# Patient Record
Sex: MALE | Race: WHITE | NOT HISPANIC OR LATINO | Employment: OTHER | URBAN - METROPOLITAN AREA
[De-identification: names, ages, dates, MRNs, and addresses within clinical notes are randomized per-mention and may not be internally consistent; named-entity substitution may affect disease eponyms.]

---

## 2023-08-23 ENCOUNTER — HOSPITAL ENCOUNTER (OUTPATIENT)
Dept: RADIOLOGY | Facility: CLINIC | Age: 73
Discharge: HOME | End: 2023-08-23
Attending: STUDENT IN AN ORGANIZED HEALTH CARE EDUCATION/TRAINING PROGRAM
Payer: MEDICARE

## 2023-08-23 ENCOUNTER — OFFICE VISIT (OUTPATIENT)
Dept: ORTHOPEDICS | Facility: CLINIC | Age: 73
End: 2023-08-23
Payer: MEDICARE

## 2023-08-23 VITALS — HEIGHT: 73 IN | WEIGHT: 234 LBS | BODY MASS INDEX: 31.01 KG/M2

## 2023-08-23 DIAGNOSIS — M79.641 RIGHT HAND PAIN: ICD-10-CM

## 2023-08-23 DIAGNOSIS — S68.110A AMPUTATION OF RIGHT INDEX FINGER: Primary | ICD-10-CM

## 2023-08-23 PROCEDURE — 99204 PR OFFICE/OUTPT VISIT, NEW, LEVL IV, 45-59 MIN: ICD-10-PCS | Mod: ,,, | Performed by: STUDENT IN AN ORGANIZED HEALTH CARE EDUCATION/TRAINING PROGRAM

## 2023-08-23 PROCEDURE — 99204 OFFICE O/P NEW MOD 45 MIN: CPT | Mod: ,,, | Performed by: STUDENT IN AN ORGANIZED HEALTH CARE EDUCATION/TRAINING PROGRAM

## 2023-08-23 PROCEDURE — 73130 XR HAND COMPLETE 3 VIEW RIGHT: ICD-10-PCS | Mod: RT,,, | Performed by: STUDENT IN AN ORGANIZED HEALTH CARE EDUCATION/TRAINING PROGRAM

## 2023-08-23 PROCEDURE — 73130 X-RAY EXAM OF HAND: CPT | Mod: RT,,, | Performed by: STUDENT IN AN ORGANIZED HEALTH CARE EDUCATION/TRAINING PROGRAM

## 2023-08-23 RX ORDER — FAMOTIDINE 40 MG/1
40 TABLET, FILM COATED ORAL NIGHTLY
COMMUNITY
Start: 2023-07-21

## 2023-08-23 RX ORDER — HYDROCODONE BITARTRATE AND ACETAMINOPHEN 5; 325 MG/1; MG/1
1 TABLET ORAL EVERY 6 HOURS PRN
Status: ON HOLD | COMMUNITY
Start: 2023-08-19 | End: 2023-08-25 | Stop reason: HOSPADM

## 2023-08-23 RX ORDER — IBUPROFEN 600 MG/1
600 TABLET ORAL EVERY 6 HOURS PRN
COMMUNITY
Start: 2023-08-19 | End: 2023-08-26

## 2023-08-23 RX ORDER — ALPRAZOLAM 0.25 MG/1
0.25 TABLET ORAL 2 TIMES DAILY PRN
COMMUNITY
Start: 2023-06-27

## 2023-08-23 RX ORDER — SODIUM CHLORIDE 9 MG/ML
INJECTION, SOLUTION INTRAVENOUS CONTINUOUS
Status: CANCELLED | OUTPATIENT
Start: 2023-08-23

## 2023-08-23 RX ORDER — CEPHALEXIN 500 MG/1
500 CAPSULE ORAL EVERY 12 HOURS
COMMUNITY
Start: 2023-08-19

## 2023-08-23 RX ORDER — PRAMIPEXOLE DIHYDROCHLORIDE 0.25 MG/1
0.5 TABLET ORAL NIGHTLY
COMMUNITY
Start: 2023-07-18

## 2023-08-23 RX ORDER — SERTRALINE HYDROCHLORIDE 100 MG/1
100 TABLET, FILM COATED ORAL NIGHTLY
COMMUNITY
Start: 2023-06-26

## 2023-08-23 RX ORDER — METFORMIN HYDROCHLORIDE 500 MG/1
500 TABLET ORAL 2 TIMES DAILY
COMMUNITY
Start: 2023-06-26

## 2023-08-23 RX ORDER — CARVEDILOL 25 MG/1
25 TABLET ORAL 2 TIMES DAILY
COMMUNITY
Start: 2023-07-07

## 2023-08-23 RX ORDER — GEMFIBROZIL 600 MG/1
600 TABLET, FILM COATED ORAL
COMMUNITY
Start: 2023-08-13

## 2023-08-23 RX ORDER — ATORVASTATIN CALCIUM 80 MG/1
80 TABLET, FILM COATED ORAL DAILY
COMMUNITY
Start: 2023-07-07

## 2023-08-23 RX ORDER — NAPROXEN SODIUM 220 MG/1
81 TABLET, FILM COATED ORAL DAILY
COMMUNITY

## 2023-08-23 RX ORDER — AMOXICILLIN 500 MG
CAPSULE ORAL DAILY
COMMUNITY

## 2023-08-23 RX ORDER — AMLODIPINE BESYLATE 2.5 MG/1
2.5 TABLET ORAL DAILY
COMMUNITY
Start: 2023-06-26

## 2023-08-23 NOTE — H&P (VIEW-ONLY)
Chief Complaint:  Right index finger table saw injury    Consulting Physician: No ref. provider found    History of present illness:    Patient is a 73-year-old right-hand dominant retired male who was cutting wood with a table saw and sustained an injury to his right hand on 08/18/2023.  He injured his index and middle finger.  The index finger was severely injured in the middle finger sustained a soft tissue laceration.  He was seen at an outside hospital and then was transferred to [facility] in [place].  He was evaluated by a PGY2 resident in the emergency department where a Plan was to irrigate the wound and close the skin.  Returned back home to this region and then scheduled an appointment with me.    He has pain in the index finger.  He is able to feel at the tip of the index finger.  The finger is held in a flexed position at the PIP joint.  He has significant pain with motion of this finger.  Makes it very clear during this interview that he is not interested in multiple surgeries on this finger.  He would like an amputation of this finger so that he can return to activity as soon as possible.    Past Medical History:   Diagnosis Date    Diabetes mellitus, type 2     High cholesterol     Hypertension     Spinal stenosis        Past Surgical History:   Procedure Laterality Date    BACK SURGERY      INJECTION OF NERVE      LAMINECTOMY      NECK SURGERY         Current Outpatient Medications   Medication Sig    ALPRAZolam (XANAX) 0.25 MG tablet Take 0.25 mg by mouth 2 (two) times daily as needed.    amLODIPine (NORVASC) 2.5 MG tablet Take 2.5 mg by mouth.    aspirin 81 MG Chew Take 81 mg by mouth once daily.    atorvastatin (LIPITOR) 80 MG tablet Take 80 mg by mouth.    carvediloL (COREG) 25 MG tablet Take 25 mg by mouth 2 (two) times daily.    cephALEXin (KEFLEX) 500 MG capsule Take 500 mg by mouth every 12 (twelve) hours.    famotidine (PEPCID) 40 MG tablet Take 40 mg by mouth every evening.     "gemfibroziL (LOPID) 600 MG tablet SMARTSI Tablet(s) By Mouth Morning-Evening    HYDROcodone-acetaminophen (NORCO) 5-325 mg per tablet Take 1 tablet by mouth every 6 (six) hours as needed.    ibuprofen (ADVIL,MOTRIN) 600 MG tablet Take 600 mg by mouth every 6 (six) hours as needed.    metFORMIN (GLUCOPHAGE) 500 MG tablet Take 500 mg by mouth 2 (two) times daily.    omega-3 fatty acids/fish oil (FISH OIL-OMEGA-3 FATTY ACIDS) 300-1,000 mg capsule Take by mouth once daily.    pramipexole (MIRAPEX) 0.25 MG tablet Take 0.25 mg by mouth.    sertraline (ZOLOFT) 100 MG tablet Take 100 mg by mouth.     No current facility-administered medications for this visit.       Review of patient's allergies indicates:  No Known Allergies    History reviewed. No pertinent family history.    Social History     Socioeconomic History    Marital status:    Tobacco Use    Smoking status: Never    Smokeless tobacco: Never   Substance and Sexual Activity    Alcohol use: Yes     Comment: social       Review of Systems:    Constitution:   Denies chills, fever, and sweats.  HENT:   Denies headaches or blurry vision.  Cardiovascular:  Denies chest pain or irregular heart beat.  Respiratory:   Denies cough or shortness of breath.  Gastrointestinal:  Denies abdominal pain, nausea, or vomiting.  Musculoskeletal:   Denies muscle cramps.  Neurological:   Denies dizziness or focal weakness.  Psychiatric/Behavior: Normal mental status.  Hematology/Lymph:  Denies bleeding problem or easy bruising/bleeding.  Skin:    Denies rash or suspicious lesions.    Examination:    Vital Signs:    Vitals:    23 1546   Weight: 106.1 kg (234 lb)   Height: 6' 1" (1.854 m)   PainSc:   6       Body mass index is 30.87 kg/m².    Constitution:   Well-developed, well nourished patient in no acute distress.  Neurological:   Alert and oriented x 3 and cooperative to examination.     Psychiatric/Behavior: Normal mental status.  Respiratory:   No shortness of " breath.  Eyes:    Extraoccular muscles intact  Skin:    No scars, rash or suspicious lesions.    MSK:   Right hand:  Traumatic laceration in the sagittal plane along the index finger on the dorsal side.  Tenderness to palpation over the PIP joint.  FDS and FDP tendons are intact to the index finger.  Sensation to light touch is intact to the radial and ulnar aspect on the volar side of the index finger.  He is unable to extend the index finger at the PIP joint.  There was also a traumatic laceration of the middle finger over the radial side extending into the nail which has been sutured.  Sutures are in place and there is no current evidence of infection.  Radial pulses 2+.  All fingers are warm and well perfused with brisk capillary refill    Imaging:   X-ray of the right hand shows severely comminuted proximal phalanx fracture of the index finger with a condylar split.  There appears to be some bone loss.  There is also a fracture involving the base of the middle phalanx with soft tissue disruption     Assessment:  Right index finger table saw injury    Plan:  I had a lengthy conversation with him about the nature of his injury.  I explained that if he wanted to salvage this finger, I would K-wire the bony fragments in an acceptable position and this would likely be stage I for a possible fusion in the future.  He also has a likely extensor disruption and I will need to repair that as well.  I explained that after all of this I do not ever think that this will be a normal finger and the best case scenario would be that he would have a PIP joint fusion.  I made it very clear to him that I am happy to reconstruct this finger for him however he would like an amputation.  He was worried that this may become a stiff and painful finger.  He also does not want to have multiple operations on his finger.  He would like 1 operation that would be definitive and he requests that it be an amputation.     Given that this is an  index finger and the amount of trauma that has sustained especially to the PIP joint, I think an amputation is a reasonable option.  I would amputated alf at the proximal phalanx to give him a small stub to pinch against while removing the distal aspect of the finger so that it does not get in his way when he was trying to use his hand.    Do not think the middle finger needs any additional treatment at this time.    I explained that surgery and the nature of their condition are not without risks. These include, but are not limited to, bleeding, infection, neurovascular compromise, neuroma formation, CRPS, phantom pain, wound complications, scarring, cosmetic defects, need for later and/or repeated surgeries, pain, loss of ROM, loss of function, deformity, functional abnormalities, stiffness, thromboembolic complications, compartment syndrome, loss of limb, loss of life, anesthetic complications, and other imponderables. I explained that these can occur despite the adequacy of treatments rendered, and that their risks are heightened given the nature of their condition. They verbalized understanding. They would like to continue with surgery at this time. If appropriate family was involved with surgical discussion.      Follow Up:  After surgery  Xray at next visit:  None

## 2023-08-23 NOTE — PROGRESS NOTES
Chief Complaint:  Right index finger table saw injury    Consulting Physician: No ref. provider found    History of present illness:    Patient is a 73-year-old right-hand dominant retired male who was cutting wood with a table saw and sustained an injury to his right hand on 08/18/2023.  He injured his index and middle finger.  The index finger was severely injured in the middle finger sustained a soft tissue laceration.  He was seen at an outside hospital and then was transferred to [facility] in [place].  He was evaluated by a PGY2 resident in the emergency department where a Plan was to irrigate the wound and close the skin.  Returned back home to this region and then scheduled an appointment with me.    He has pain in the index finger.  He is able to feel at the tip of the index finger.  The finger is held in a flexed position at the PIP joint.  He has significant pain with motion of this finger.  Makes it very clear during this interview that he is not interested in multiple surgeries on this finger.  He would like an amputation of this finger so that he can return to activity as soon as possible.    Past Medical History:   Diagnosis Date    Diabetes mellitus, type 2     High cholesterol     Hypertension     Spinal stenosis        Past Surgical History:   Procedure Laterality Date    BACK SURGERY      INJECTION OF NERVE      LAMINECTOMY      NECK SURGERY         Current Outpatient Medications   Medication Sig    ALPRAZolam (XANAX) 0.25 MG tablet Take 0.25 mg by mouth 2 (two) times daily as needed.    amLODIPine (NORVASC) 2.5 MG tablet Take 2.5 mg by mouth.    aspirin 81 MG Chew Take 81 mg by mouth once daily.    atorvastatin (LIPITOR) 80 MG tablet Take 80 mg by mouth.    carvediloL (COREG) 25 MG tablet Take 25 mg by mouth 2 (two) times daily.    cephALEXin (KEFLEX) 500 MG capsule Take 500 mg by mouth every 12 (twelve) hours.    famotidine (PEPCID) 40 MG tablet Take 40 mg by mouth every evening.     "gemfibroziL (LOPID) 600 MG tablet SMARTSI Tablet(s) By Mouth Morning-Evening    HYDROcodone-acetaminophen (NORCO) 5-325 mg per tablet Take 1 tablet by mouth every 6 (six) hours as needed.    ibuprofen (ADVIL,MOTRIN) 600 MG tablet Take 600 mg by mouth every 6 (six) hours as needed.    metFORMIN (GLUCOPHAGE) 500 MG tablet Take 500 mg by mouth 2 (two) times daily.    omega-3 fatty acids/fish oil (FISH OIL-OMEGA-3 FATTY ACIDS) 300-1,000 mg capsule Take by mouth once daily.    pramipexole (MIRAPEX) 0.25 MG tablet Take 0.25 mg by mouth.    sertraline (ZOLOFT) 100 MG tablet Take 100 mg by mouth.     No current facility-administered medications for this visit.       Review of patient's allergies indicates:  No Known Allergies    History reviewed. No pertinent family history.    Social History     Socioeconomic History    Marital status:    Tobacco Use    Smoking status: Never    Smokeless tobacco: Never   Substance and Sexual Activity    Alcohol use: Yes     Comment: social       Review of Systems:    Constitution:   Denies chills, fever, and sweats.  HENT:   Denies headaches or blurry vision.  Cardiovascular:  Denies chest pain or irregular heart beat.  Respiratory:   Denies cough or shortness of breath.  Gastrointestinal:  Denies abdominal pain, nausea, or vomiting.  Musculoskeletal:   Denies muscle cramps.  Neurological:   Denies dizziness or focal weakness.  Psychiatric/Behavior: Normal mental status.  Hematology/Lymph:  Denies bleeding problem or easy bruising/bleeding.  Skin:    Denies rash or suspicious lesions.    Examination:    Vital Signs:    Vitals:    23 1546   Weight: 106.1 kg (234 lb)   Height: 6' 1" (1.854 m)   PainSc:   6       Body mass index is 30.87 kg/m².    Constitution:   Well-developed, well nourished patient in no acute distress.  Neurological:   Alert and oriented x 3 and cooperative to examination.     Psychiatric/Behavior: Normal mental status.  Respiratory:   No shortness of " breath.  Eyes:    Extraoccular muscles intact  Skin:    No scars, rash or suspicious lesions.    MSK:   Right hand:  Traumatic laceration in the sagittal plane along the index finger on the dorsal side.  Tenderness to palpation over the PIP joint.  FDS and FDP tendons are intact to the index finger.  Sensation to light touch is intact to the radial and ulnar aspect on the volar side of the index finger.  He is unable to extend the index finger at the PIP joint.  There was also a traumatic laceration of the middle finger over the radial side extending into the nail which has been sutured.  Sutures are in place and there is no current evidence of infection.  Radial pulses 2+.  All fingers are warm and well perfused with brisk capillary refill    Imaging:   X-ray of the right hand shows severely comminuted proximal phalanx fracture of the index finger with a condylar split.  There appears to be some bone loss.  There is also a fracture involving the base of the middle phalanx with soft tissue disruption     Assessment:  Right index finger table saw injury    Plan:  I had a lengthy conversation with him about the nature of his injury.  I explained that if he wanted to salvage this finger, I would K-wire the bony fragments in an acceptable position and this would likely be stage I for a possible fusion in the future.  He also has a likely extensor disruption and I will need to repair that as well.  I explained that after all of this I do not ever think that this will be a normal finger and the best case scenario would be that he would have a PIP joint fusion.  I made it very clear to him that I am happy to reconstruct this finger for him however he would like an amputation.  He was worried that this may become a stiff and painful finger.  He also does not want to have multiple operations on his finger.  He would like 1 operation that would be definitive and he requests that it be an amputation.     Given that this is an  index finger and the amount of trauma that has sustained especially to the PIP joint, I think an amputation is a reasonable option.  I would amputated group home at the proximal phalanx to give him a small stub to pinch against while removing the distal aspect of the finger so that it does not get in his way when he was trying to use his hand.    Do not think the middle finger needs any additional treatment at this time.    I explained that surgery and the nature of their condition are not without risks. These include, but are not limited to, bleeding, infection, neurovascular compromise, neuroma formation, CRPS, phantom pain, wound complications, scarring, cosmetic defects, need for later and/or repeated surgeries, pain, loss of ROM, loss of function, deformity, functional abnormalities, stiffness, thromboembolic complications, compartment syndrome, loss of limb, loss of life, anesthetic complications, and other imponderables. I explained that these can occur despite the adequacy of treatments rendered, and that their risks are heightened given the nature of their condition. They verbalized understanding. They would like to continue with surgery at this time. If appropriate family was involved with surgical discussion.      Follow Up:  After surgery  Xray at next visit:  None

## 2023-08-24 ENCOUNTER — HOSPITAL ENCOUNTER (OUTPATIENT)
Dept: RADIOLOGY | Facility: HOSPITAL | Age: 73
Discharge: HOME | End: 2023-08-24
Attending: STUDENT IN AN ORGANIZED HEALTH CARE EDUCATION/TRAINING PROGRAM
Payer: MEDICARE

## 2023-08-24 ENCOUNTER — ANESTHESIA EVENT (OUTPATIENT)
Dept: SURGERY | Facility: HOSPITAL | Age: 73
End: 2023-08-24
Payer: MEDICARE

## 2023-08-24 DIAGNOSIS — S68.110A AMPUTATION OF RIGHT INDEX FINGER: ICD-10-CM

## 2023-08-24 PROCEDURE — 71046 X-RAY EXAM CHEST 2 VIEWS: CPT | Mod: TC

## 2023-08-24 RX ORDER — ACETAMINOPHEN, DIPHENHYDRAMINE HCL, PHENYLEPHRINE HCL 325; 25; 5 MG/1; MG/1; MG/1
1 TABLET ORAL NIGHTLY
COMMUNITY

## 2023-08-25 ENCOUNTER — HOSPITAL ENCOUNTER (OUTPATIENT)
Facility: HOSPITAL | Age: 73
Discharge: HOME | End: 2023-08-25
Attending: STUDENT IN AN ORGANIZED HEALTH CARE EDUCATION/TRAINING PROGRAM | Admitting: STUDENT IN AN ORGANIZED HEALTH CARE EDUCATION/TRAINING PROGRAM
Payer: MEDICARE

## 2023-08-25 ENCOUNTER — ANESTHESIA (OUTPATIENT)
Dept: SURGERY | Facility: HOSPITAL | Age: 73
End: 2023-08-25
Payer: MEDICARE

## 2023-08-25 DIAGNOSIS — S68.110A AMPUTATION OF RIGHT INDEX FINGER: ICD-10-CM

## 2023-08-25 DIAGNOSIS — M79.641 RIGHT HAND PAIN: ICD-10-CM

## 2023-08-25 LAB — POCT GLUCOSE: 107 MG/DL (ref 70–110)

## 2023-08-25 PROCEDURE — 63600175 PHARM REV CODE 636 W HCPCS: Performed by: STUDENT IN AN ORGANIZED HEALTH CARE EDUCATION/TRAINING PROGRAM

## 2023-08-25 PROCEDURE — D9220A PRA ANESTHESIA: Mod: ANES,,, | Performed by: ANESTHESIOLOGY

## 2023-08-25 PROCEDURE — 71000016 HC POSTOP RECOV ADDL HR: Performed by: STUDENT IN AN ORGANIZED HEALTH CARE EDUCATION/TRAINING PROGRAM

## 2023-08-25 PROCEDURE — 63600175 PHARM REV CODE 636 W HCPCS: Performed by: NURSE ANESTHETIST, CERTIFIED REGISTERED

## 2023-08-25 PROCEDURE — 36000707: Performed by: STUDENT IN AN ORGANIZED HEALTH CARE EDUCATION/TRAINING PROGRAM

## 2023-08-25 PROCEDURE — 71000015 HC POSTOP RECOV 1ST HR: Performed by: STUDENT IN AN ORGANIZED HEALTH CARE EDUCATION/TRAINING PROGRAM

## 2023-08-25 PROCEDURE — 37000008 HC ANESTHESIA 1ST 15 MINUTES: Performed by: STUDENT IN AN ORGANIZED HEALTH CARE EDUCATION/TRAINING PROGRAM

## 2023-08-25 PROCEDURE — D9220A PRA ANESTHESIA: Mod: CRNA,,, | Performed by: NURSE ANESTHETIST, CERTIFIED REGISTERED

## 2023-08-25 PROCEDURE — 36000706: Performed by: STUDENT IN AN ORGANIZED HEALTH CARE EDUCATION/TRAINING PROGRAM

## 2023-08-25 PROCEDURE — 25000003 PHARM REV CODE 250: Performed by: NURSE ANESTHETIST, CERTIFIED REGISTERED

## 2023-08-25 PROCEDURE — 88311 DECALCIFY TISSUE: CPT

## 2023-08-25 PROCEDURE — D9220A PRA ANESTHESIA: ICD-10-PCS | Mod: CRNA,,, | Performed by: NURSE ANESTHETIST, CERTIFIED REGISTERED

## 2023-08-25 PROCEDURE — D9220A PRA ANESTHESIA: ICD-10-PCS | Mod: ANES,,, | Performed by: ANESTHESIOLOGY

## 2023-08-25 PROCEDURE — 26951 PR AMPUTATION FINGER/THUMB: ICD-10-PCS | Mod: F6,,, | Performed by: STUDENT IN AN ORGANIZED HEALTH CARE EDUCATION/TRAINING PROGRAM

## 2023-08-25 PROCEDURE — 88302 TISSUE EXAM BY PATHOLOGIST: CPT | Performed by: STUDENT IN AN ORGANIZED HEALTH CARE EDUCATION/TRAINING PROGRAM

## 2023-08-25 PROCEDURE — 25000003 PHARM REV CODE 250: Performed by: STUDENT IN AN ORGANIZED HEALTH CARE EDUCATION/TRAINING PROGRAM

## 2023-08-25 PROCEDURE — 26951 AMPUTATION OF FINGER/THUMB: CPT | Mod: F6,,, | Performed by: STUDENT IN AN ORGANIZED HEALTH CARE EDUCATION/TRAINING PROGRAM

## 2023-08-25 PROCEDURE — 37000009 HC ANESTHESIA EA ADD 15 MINS: Performed by: STUDENT IN AN ORGANIZED HEALTH CARE EDUCATION/TRAINING PROGRAM

## 2023-08-25 RX ORDER — MORPHINE SULFATE 4 MG/ML
4 INJECTION, SOLUTION INTRAMUSCULAR; INTRAVENOUS
Status: CANCELLED | OUTPATIENT
Start: 2023-08-25

## 2023-08-25 RX ORDER — SODIUM CHLORIDE 9 MG/ML
INJECTION, SOLUTION INTRAVENOUS CONTINUOUS
Status: DISCONTINUED | OUTPATIENT
Start: 2023-08-25 | End: 2023-08-25 | Stop reason: HOSPADM

## 2023-08-25 RX ORDER — KETOROLAC TROMETHAMINE 30 MG/ML
INJECTION, SOLUTION INTRAMUSCULAR; INTRAVENOUS
Status: DISCONTINUED | OUTPATIENT
Start: 2023-08-25 | End: 2023-08-25

## 2023-08-25 RX ORDER — FENTANYL CITRATE 50 UG/ML
INJECTION, SOLUTION INTRAMUSCULAR; INTRAVENOUS
Status: DISCONTINUED | OUTPATIENT
Start: 2023-08-25 | End: 2023-08-25

## 2023-08-25 RX ORDER — LIDOCAINE HYDROCHLORIDE 10 MG/ML
1 INJECTION, SOLUTION EPIDURAL; INFILTRATION; INTRACAUDAL; PERINEURAL ONCE
Status: CANCELLED | OUTPATIENT
Start: 2023-08-25 | End: 2023-08-25

## 2023-08-25 RX ORDER — HYDROCODONE BITARTRATE AND ACETAMINOPHEN 5; 325 MG/1; MG/1
1 TABLET ORAL EVERY 6 HOURS PRN
Qty: 28 TABLET | Refills: 0 | Status: ON HOLD | OUTPATIENT
Start: 2023-08-25 | End: 2023-11-21 | Stop reason: HOSPADM

## 2023-08-25 RX ORDER — BUPIVACAINE HYDROCHLORIDE 2.5 MG/ML
INJECTION, SOLUTION EPIDURAL; INFILTRATION; INTRACAUDAL
Status: DISCONTINUED | OUTPATIENT
Start: 2023-08-25 | End: 2023-08-25 | Stop reason: HOSPADM

## 2023-08-25 RX ORDER — LIDOCAINE HYDROCHLORIDE 10 MG/ML
INJECTION INFILTRATION; PERINEURAL
Status: DISCONTINUED | OUTPATIENT
Start: 2023-08-25 | End: 2023-08-25 | Stop reason: HOSPADM

## 2023-08-25 RX ORDER — PROPOFOL 10 MG/ML
VIAL (ML) INTRAVENOUS CONTINUOUS PRN
Status: DISCONTINUED | OUTPATIENT
Start: 2023-08-25 | End: 2023-08-25

## 2023-08-25 RX ORDER — SODIUM CHLORIDE 9 MG/ML
INJECTION, SOLUTION INTRAVENOUS CONTINUOUS
Status: CANCELLED | OUTPATIENT
Start: 2023-08-25

## 2023-08-25 RX ORDER — INDOMETHACIN 25 MG/1
CAPSULE ORAL
Status: DISCONTINUED | OUTPATIENT
Start: 2023-08-25 | End: 2023-08-25 | Stop reason: HOSPADM

## 2023-08-25 RX ORDER — ACETAMINOPHEN 10 MG/ML
INJECTION, SOLUTION INTRAVENOUS
Status: DISCONTINUED | OUTPATIENT
Start: 2023-08-25 | End: 2023-08-25

## 2023-08-25 RX ORDER — LIDOCAINE HYDROCHLORIDE 20 MG/ML
INJECTION, SOLUTION EPIDURAL; INFILTRATION; INTRACAUDAL; PERINEURAL
Status: DISCONTINUED | OUTPATIENT
Start: 2023-08-25 | End: 2023-08-25

## 2023-08-25 RX ORDER — BUPIVACAINE HYDROCHLORIDE 2.5 MG/ML
INJECTION, SOLUTION EPIDURAL; INFILTRATION; INTRACAUDAL
Status: DISCONTINUED
Start: 2023-08-25 | End: 2023-08-25 | Stop reason: HOSPADM

## 2023-08-25 RX ORDER — HYDROCODONE BITARTRATE AND ACETAMINOPHEN 5; 325 MG/1; MG/1
1 TABLET ORAL EVERY 6 HOURS PRN
Qty: 28 TABLET | Refills: 0 | Status: SHIPPED | OUTPATIENT
Start: 2023-08-25 | End: 2023-08-25 | Stop reason: SDUPTHER

## 2023-08-25 RX ORDER — MORPHINE SULFATE 10 MG/ML
INJECTION INTRAMUSCULAR; INTRAVENOUS; SUBCUTANEOUS
Status: DISCONTINUED | OUTPATIENT
Start: 2023-08-25 | End: 2023-08-25

## 2023-08-25 RX ORDER — MIDAZOLAM HYDROCHLORIDE 1 MG/ML
INJECTION INTRAMUSCULAR; INTRAVENOUS
Status: DISCONTINUED | OUTPATIENT
Start: 2023-08-25 | End: 2023-08-25

## 2023-08-25 RX ORDER — LIDOCAINE HYDROCHLORIDE 10 MG/ML
INJECTION INFILTRATION; PERINEURAL
Status: DISCONTINUED
Start: 2023-08-25 | End: 2023-08-25 | Stop reason: HOSPADM

## 2023-08-25 RX ORDER — ACETAMINOPHEN 10 MG/ML
1000 INJECTION, SOLUTION INTRAVENOUS ONCE
Status: CANCELLED | OUTPATIENT
Start: 2023-08-25 | End: 2023-08-25

## 2023-08-25 RX ORDER — ONDANSETRON 2 MG/ML
4 INJECTION INTRAMUSCULAR; INTRAVENOUS DAILY PRN
Status: CANCELLED | OUTPATIENT
Start: 2023-08-25

## 2023-08-25 RX ORDER — SODIUM CHLORIDE, SODIUM GLUCONATE, SODIUM ACETATE, POTASSIUM CHLORIDE AND MAGNESIUM CHLORIDE 30; 37; 368; 526; 502 MG/100ML; MG/100ML; MG/100ML; MG/100ML; MG/100ML
INJECTION, SOLUTION INTRAVENOUS CONTINUOUS
Status: CANCELLED | OUTPATIENT
Start: 2023-08-25 | End: 2023-09-24

## 2023-08-25 RX ORDER — ACETAMINOPHEN 10 MG/ML
INJECTION, SOLUTION INTRAVENOUS
Status: DISCONTINUED
Start: 2023-08-25 | End: 2023-08-25 | Stop reason: HOSPADM

## 2023-08-25 RX ORDER — ONDANSETRON 2 MG/ML
4 INJECTION INTRAMUSCULAR; INTRAVENOUS EVERY 6 HOURS PRN
Status: CANCELLED | OUTPATIENT
Start: 2023-08-25

## 2023-08-25 RX ORDER — HYDROCODONE BITARTRATE AND ACETAMINOPHEN 5; 325 MG/1; MG/1
1 TABLET ORAL EVERY 4 HOURS PRN
Status: CANCELLED | OUTPATIENT
Start: 2023-08-25

## 2023-08-25 RX ORDER — METOCLOPRAMIDE HYDROCHLORIDE 5 MG/ML
10 INJECTION INTRAMUSCULAR; INTRAVENOUS EVERY 6 HOURS PRN
Status: CANCELLED | OUTPATIENT
Start: 2023-08-25

## 2023-08-25 RX ORDER — GLYCOPYRROLATE 0.2 MG/ML
INJECTION INTRAMUSCULAR; INTRAVENOUS
Status: DISCONTINUED | OUTPATIENT
Start: 2023-08-25 | End: 2023-08-25

## 2023-08-25 RX ORDER — HYDROMORPHONE HYDROCHLORIDE 2 MG/ML
0.2 INJECTION, SOLUTION INTRAMUSCULAR; INTRAVENOUS; SUBCUTANEOUS EVERY 5 MIN PRN
Status: CANCELLED | OUTPATIENT
Start: 2023-08-25

## 2023-08-25 RX ORDER — DIPHENHYDRAMINE HYDROCHLORIDE 50 MG/ML
25 INJECTION INTRAMUSCULAR; INTRAVENOUS EVERY 6 HOURS PRN
Status: CANCELLED | OUTPATIENT
Start: 2023-08-25

## 2023-08-25 RX ORDER — MIDAZOLAM HYDROCHLORIDE 1 MG/ML
2 INJECTION INTRAMUSCULAR; INTRAVENOUS ONCE AS NEEDED
Status: CANCELLED | OUTPATIENT
Start: 2023-08-25 | End: 2035-01-20

## 2023-08-25 RX ORDER — INDOMETHACIN 25 MG/1
CAPSULE ORAL
Status: DISCONTINUED
Start: 2023-08-25 | End: 2023-08-25 | Stop reason: HOSPADM

## 2023-08-25 RX ORDER — ONDANSETRON 2 MG/ML
INJECTION INTRAMUSCULAR; INTRAVENOUS
Status: DISCONTINUED | OUTPATIENT
Start: 2023-08-25 | End: 2023-08-25

## 2023-08-25 RX ORDER — METHOCARBAMOL 500 MG/1
500 TABLET, FILM COATED ORAL EVERY 6 HOURS PRN
Status: CANCELLED | OUTPATIENT
Start: 2023-08-25

## 2023-08-25 RX ADMIN — PROPOFOL 110 MCG/KG/MIN: 10 INJECTION, EMULSION INTRAVENOUS at 07:08

## 2023-08-25 RX ADMIN — CEFAZOLIN 2 G: 2 INJECTION, POWDER, FOR SOLUTION INTRAMUSCULAR; INTRAVENOUS at 08:08

## 2023-08-25 RX ADMIN — MIDAZOLAM 2 MG: 1 INJECTION INTRAMUSCULAR; INTRAVENOUS at 07:08

## 2023-08-25 RX ADMIN — LIDOCAINE HYDROCHLORIDE 50 MG: 20 INJECTION, SOLUTION EPIDURAL; INFILTRATION; INTRACAUDAL; PERINEURAL at 07:08

## 2023-08-25 RX ADMIN — FENTANYL CITRATE 100 MCG: 50 INJECTION, SOLUTION INTRAMUSCULAR; INTRAVENOUS at 07:08

## 2023-08-25 RX ADMIN — KETOROLAC TROMETHAMINE 30 MG: 30 INJECTION, SOLUTION INTRAMUSCULAR at 08:08

## 2023-08-25 RX ADMIN — MORPHINE SULFATE 10 MG: 10 INJECTION INTRAVENOUS at 08:08

## 2023-08-25 RX ADMIN — ONDANSETRON HYDROCHLORIDE 4 MG: 2 SOLUTION INTRAMUSCULAR; INTRAVENOUS at 07:08

## 2023-08-25 RX ADMIN — SODIUM CHLORIDE, SODIUM GLUCONATE, SODIUM ACETATE, POTASSIUM CHLORIDE AND MAGNESIUM CHLORIDE: 526; 502; 368; 37; 30 INJECTION, SOLUTION INTRAVENOUS at 07:08

## 2023-08-25 RX ADMIN — ACETAMINOPHEN 1000 MG: 10 INJECTION, SOLUTION INTRAVENOUS at 08:08

## 2023-08-25 RX ADMIN — GLYCOPYRROLATE 0.2 MG: 0.2 INJECTION INTRAMUSCULAR; INTRAVENOUS at 07:08

## 2023-08-25 NOTE — OP NOTE
Operative Note    Patient Information:  Sid Richards    Date of Surgery:  08/25/2023    Surgeon:  Zachery Padilla MD    Assistant:  None    Pre-operative Diagnosis:  Right index finger tablesaw injury    Post-operative Diagnosis:  same    Procedure Performed:  Right index finger amputation     Anesthesia:  MAC and local    Complications:  None    Blood Loss:  5 cc    Specimens:  None    Implants:  * No implants in log *     Indications for Procedure:  Sid Richards is a 73 y.o. male that sustained a tablesaw injury to the right hand specifically to the index and middle fingers. He was transferred to a level one trauma center for this finger where he was evaluated by a resident in the ER, sutured, and discharged. I saw him in my clinic where I diagnosed him with severely comminuted proximal phalanx head and middle phalanx base fractures. I offered him ORIF of these fracture fragments in preparation for a PIPJ fusion in the future. He explained that he is not interested in multiple surgeries and recovery. He requested that I amputate the finger. Given the amount of soft tissue and bony trauma the finger sustained, I think amputation is a reasonable option since he want the fastest and most painless recovery. I booked and consented him for right index finger revision amputation.     Risks and benefits of the procedure were discussed with the patient. I explained that surgery and the nature of their condition are not without risks. These include, but are not limited to, bleeding, infection, neurovascular compromise, neuroma, wound complications, scarring, cosmetic defects, need for later and/or repeated surgeries, pain, loss of ROM, loss of function, deformity, functional abnormalities, stiffness, thromboembolic complications, compartment syndrome, loss of limb, loss of life, anesthetic complications, and other imponderables. I explained that these can occur despite the adequacy of treatments rendered, and that their  risks are heightened given the nature of their condition. They verbalized understanding. They would like to continue with surgery at this time. If appropriate family was involved with surgical discussion. The patient expressed understanding of and agreement with the plan. Informed consent was obtained and signed prior going to the operative room.    Procedure in Detail:  Patient was brought to the operating room by the anesthesia team. Anesthesia was administered per the anesthesia record. The patient was left on the stretcher and a hand table was placed on the side of the bed.     Time out was performed and all parties present agreed with correct patient, correct procedure, correct side, correct site.  A digital block was performed with lidocaine 1% without epinephrine.  The operative extremity was prepped and draped in standard normal fashion.     Pre-incision antibiotics were administered prior to skin incision. The tourniquet was inflated to 250mm HG.     A curvilinear incision was made over the volar and dorsal aspect of the index finger.  There was a complete extensor tendon laceration and the dorsal soft tissues were devitalized. Flaps were elevated off of the bone.  The bone was trimmed back with a rongeur to the level of proximal phalanx shaft.  Both neurovascular bundles were identified.  Traction neurectomies were performed of the digital nerves.  The digital arteries were cauterized.  The flexor tendons were both cut and they retracted into the palm.  The wound was copiously irrigated with normal saline.  The skin flaps were advanced over the bony cut.  Dog ears were trimmed and the flaps were closed with 4-0 nylon suture and 5 0 plain gut suture.  A sterile dressing was applied.      Tourniquet was deflated. Patient was awoken from anesthesia without complications and transferred to the recovery room in stable condition.       Post-operative Plan:  Patient will keep the incision clean and dry until they  come to clinic in 2 weeks.

## 2023-08-25 NOTE — TRANSFER OF CARE
Anesthesia Transfer of Care Note    Patient: Sid Richards    Procedure(s) Performed: Procedure(s) (LRB):  AMPUTATION, FINGER (Right)    Patient location: OPS    Anesthesia Type: general    Transport from OR: Transported from OR on room air with adequate spontaneous ventilation    Post pain: adequate analgesia    Post assessment: no apparent anesthetic complications and tolerated procedure well    Post vital signs: stable    Level of consciousness: awake    Nausea/Vomiting: no nausea/vomiting    Complications: none    Transfer of care protocol was followed      Last vitals:   Visit Vitals  /61   Pulse 68   Temp 36.3 °C (97.3 °F)   Resp 16   Ht 6' (1.829 m)   Wt 106.2 kg (234 lb 2.1 oz)   SpO2 97%   BMI 31.75 kg/m²

## 2023-08-25 NOTE — DISCHARGE SUMMARY
[facility] - Periop Services  Discharge Note  Short Stay    Procedure(s) (LRB):  AMPUTATION, FINGER (Right)      OUTCOME: Patient tolerated treatment/procedure well without complication and is now ready for discharge.    DISPOSITION: Home or Self Care    FINAL DIAGNOSIS:  <principal problem not specified>    FOLLOWUP: In clinic    DISCHARGE INSTRUCTIONS:    Discharge Procedure Orders   Diet general     Activity as tolerated     Keep surgical extremity elevated     Ice to affected area     Lifting restrictions   Order Comments: No lifting, pushing, pulling with operative extremity     No driving, operating heavy equipment or signing legal documents while taking pain medication.     Other restrictions (specify):   Order Comments: Okay to wean sling as tolerated.     Leave dressing on - Keep it clean, dry, and intact until clinic visit     Call MD for:  temperature >100.4     Call MD for:  persistent nausea and vomiting     Call MD for:  severe uncontrolled pain     Call MD for:  difficulty breathing, headache or visual disturbances     Call MD for:  redness, tenderness, or signs of infection (pain, swelling, redness, odor or green/yellow discharge around incision site)     Call MD for:  hives     Call MD for:  persistent dizziness or light-headedness     Call MD for:  extreme fatigue     Shower on day dressing removed (No bath)        TIME SPENT ON DISCHARGE: 15 minutes

## 2023-08-25 NOTE — ANESTHESIA PREPROCEDURE EVALUATION
"                                                                                                             08/25/2023  Sid Richards is a 73 y.o., male with right index finger injury after table saw mishap.  Here today for amputation.  He is otherwise feeling well, at baseline, capable of 4 mets without issues.    "Chief Complaint:  Right index finger table saw injury     Consulting Physician: No ref. provider found     History of present illness:     Patient is a 73-year-old right-hand dominant retired male who was cutting wood with a table saw and sustained an injury to his right hand on 08/18/2023.  He injured his index and middle finger.  The index finger was severely injured in the middle finger sustained a soft tissue laceration.  He was seen at an outside hospital and then was transferred to [facility].  He was evaluated by a PGY2 resident in the emergency department where a Plan was to irrigate the wound and close the skin.  Returned back home to this region and then scheduled an appointment with me.     He has pain in the index finger.  He is able to feel at the tip of the index finger.  The finger is held in a flexed position at the PIP joint.  He has significant pain with motion of this finger.  Makes it very clear during this interview that he is not interested in multiple surgeries on this finger.  He would like an amputation of this finger so that he can return to activity as soon as possible.          Past Medical History:   Diagnosis Date    Diabetes mellitus, type 2      High cholesterol      Hypertension      Spinal stenosis                 Past Surgical History:   Procedure Laterality Date    BACK SURGERY        INJECTION OF NERVE        LAMINECTOMY        NECK SURGERY        "      Pre-op Assessment    I have reviewed the Patient Summary Reports.     I have reviewed the Nursing Notes. I have reviewed the NPO Status.   I have reviewed the Medications.     Review " of Systems  Anesthesia Hx:  No problems with previous Anesthesia  Denies Family Hx of Anesthesia complications.   Denies Personal Hx of Anesthesia complications.   Cardiovascular:   Exercise tolerance: good Hypertension  Denies Angina.    Pulmonary:  Pulmonary Normal  Denies Shortness of breath.    Endocrine:   Diabetes  Obesity / BMI > 30      Physical Exam  General: Well nourished, Cooperative, Alert and Oriented    Airway:  Mallampati: II   Mouth Opening: Normal  TM Distance: Normal  Tongue: Normal  Neck ROM: Normal ROM    Dental:  Intact    Chest/Lungs:  Clear to auscultation, Normal Respiratory Rate    Heart:  Rate: Normal  Rhythm: Regular Rhythm        Anesthesia Plan  Type of Anesthesia, risks & benefits discussed:    Anesthesia Type: Gen Natural Airway  Intra-op Monitoring Plan: Standard ASA Monitors  Post Op Pain Control Plan: multimodal analgesia  Induction:  IV  Informed Consent: Informed consent signed with the Patient and all parties understand the risks and agree with anesthesia plan.  All questions answered.   ASA Score: 2  Day of Surgery Review of History & Physical: H&P Update referred to the surgeon/provider.  Anesthesia Plan Notes: Digital block/local per surgeon    Ready For Surgery From Anesthesia Perspective.     .

## 2023-08-28 VITALS
WEIGHT: 234.13 LBS | HEIGHT: 72 IN | SYSTOLIC BLOOD PRESSURE: 128 MMHG | HEART RATE: 70 BPM | DIASTOLIC BLOOD PRESSURE: 68 MMHG | RESPIRATION RATE: 20 BRPM | OXYGEN SATURATION: 91 % | BODY MASS INDEX: 31.71 KG/M2 | TEMPERATURE: 97 F

## 2023-08-29 NOTE — ANESTHESIA POSTPROCEDURE EVALUATION
Anesthesia Post Evaluation    Patient: Sid Richards    Procedure(s) Performed: Procedure(s) (LRB):  AMPUTATION, FINGER (Right)    Final Anesthesia Type: general      Patient location during evaluation: PACU  Patient participation: Yes- Able to Participate  Level of consciousness: awake and alert  Post-procedure vital signs: reviewed and stable  Pain management: adequate  Airway patency: patent      Anesthetic complications: no      Cardiovascular status: blood pressure returned to baseline  Respiratory status: unassisted  Hydration status: euvolemic  Follow-up not needed.          Vitals Value Taken Time   /68 08/25/23 0946   Temp ** 08/29/23 1305   Pulse 71 08/25/23 0950   Resp 20 08/25/23 0945   SpO2 96 % 08/25/23 0950   Vitals shown include unvalidated device data.      No case tracking events are documented in the log.      Pain/Marcie Score: No data recorded

## 2023-08-31 LAB — VIEW PATHOLOGY REPORT (RELIAPATH): NORMAL

## 2023-09-13 ENCOUNTER — OFFICE VISIT (OUTPATIENT)
Dept: ORTHOPEDICS | Facility: CLINIC | Age: 73
End: 2023-09-13
Payer: MEDICARE

## 2023-09-13 VITALS
BODY MASS INDEX: 31.72 KG/M2 | HEART RATE: 73 BPM | HEIGHT: 72 IN | SYSTOLIC BLOOD PRESSURE: 143 MMHG | DIASTOLIC BLOOD PRESSURE: 75 MMHG | WEIGHT: 234.19 LBS

## 2023-09-13 DIAGNOSIS — S68.110A AMPUTATION OF RIGHT INDEX FINGER: Primary | ICD-10-CM

## 2023-09-13 PROCEDURE — 99024 POSTOP FOLLOW-UP VISIT: CPT | Mod: POP,,, | Performed by: STUDENT IN AN ORGANIZED HEALTH CARE EDUCATION/TRAINING PROGRAM

## 2023-09-13 PROCEDURE — 99024 PR POST-OP FOLLOW-UP VISIT: ICD-10-PCS | Mod: POP,,, | Performed by: STUDENT IN AN ORGANIZED HEALTH CARE EDUCATION/TRAINING PROGRAM

## 2023-09-13 NOTE — PROGRESS NOTES
Postop Clinic Note    Surgery:  Right index finger amputation 08/25/2023    History of present illness:    Patient is doing well.  His wounds are healing.  He has no pain today.  He has been working on range of motion. No fevers or chills.       Past Surgical History:   Procedure Laterality Date    BACK SURGERY      CERVICAL LAMINECTOMY      COLONOSCOPY      FINGER AMPUTATION Right 8/25/2023    Procedure: AMPUTATION, FINGER;  Surgeon: aZchery Padilla MD;  Location: [facility];  Service: Orthopedics;  Laterality: Right;    INJECTION OF NERVE      LAMINECTOMY      WISDOM TOOTH EXTRACTION             Examination:    Vital Signs:    Vitals:    09/13/23 0821   BP: (!) 143/75   Pulse: 73   Weight: 106.2 kg (234 lb 3.2 oz)   Height: 6' (1.829 m)       Body mass index is 31.76 kg/m².    Constitution:   Well-developed, well nourished patient in no acute distress.  Neurological:   Alert and oriented x 3 and cooperative to examination.     Psychiatric/Behavior: Normal mental status.  Respiratory:   No shortness of breath.  Eyes:    Extraoccular muscles intact  Skin:    No scars, rash or suspicious lesions.    MSK:   Right hand: Right index finger amputation stump is healing well with sutures in place no signs of infection. He is able to flex and extend actively at the MP joint. No TTP around the incision.  Sensation light touch intact around the amputation stump    Imaging:   No new imaging     Assessment:  Status post right index finger amputation    Plan:  He is doing well.  We will take sutures out today.  I will get him into therapy to start working on range of motion of the hand.  He is very happy with his result so far.  I can see him back in 4 weeks to see how he is doing    Follow Up:  4 weeks  Xray at next visit: none

## 2023-10-12 DIAGNOSIS — S68.110A AMPUTATION OF RIGHT INDEX FINGER: Primary | ICD-10-CM

## 2023-10-12 RX ORDER — SULFAMETHOXAZOLE AND TRIMETHOPRIM 800; 160 MG/1; MG/1
1 TABLET ORAL 2 TIMES DAILY
Qty: 20 TABLET | Refills: 0 | Status: SHIPPED | OUTPATIENT
Start: 2023-10-12 | End: 2023-10-22

## 2023-10-12 NOTE — TELEPHONE ENCOUNTER
Karis Harden contacted our office with concerns for infection. Dr. Padilla ordered antibiotics. Patient states that the wrong pharmacy was on file. I changed the pharmacy and routed to Dr. Padilla again.

## 2023-10-13 RX ORDER — SULFAMETHOXAZOLE AND TRIMETHOPRIM 800; 160 MG/1; MG/1
1 TABLET ORAL 2 TIMES DAILY
Qty: 20 TABLET | Refills: 0 | OUTPATIENT
Start: 2023-10-13 | End: 2023-10-23

## 2023-10-16 ENCOUNTER — OFFICE VISIT (OUTPATIENT)
Dept: ORTHOPEDICS | Facility: CLINIC | Age: 73
End: 2023-10-16
Payer: MEDICARE

## 2023-10-16 DIAGNOSIS — L03.011 PARONYCHIA OF RIGHT MIDDLE FINGER: ICD-10-CM

## 2023-10-16 DIAGNOSIS — S68.110A AMPUTATION OF RIGHT INDEX FINGER: Primary | ICD-10-CM

## 2023-10-16 PROCEDURE — 99213 OFFICE O/P EST LOW 20 MIN: CPT | Mod: 24,,, | Performed by: STUDENT IN AN ORGANIZED HEALTH CARE EDUCATION/TRAINING PROGRAM

## 2023-10-16 PROCEDURE — 99213 PR OFFICE/OUTPT VISIT, EST, LEVL III, 20-29 MIN: ICD-10-PCS | Mod: 24,,, | Performed by: STUDENT IN AN ORGANIZED HEALTH CARE EDUCATION/TRAINING PROGRAM

## 2023-10-30 ENCOUNTER — OFFICE VISIT (OUTPATIENT)
Dept: ORTHOPEDICS | Facility: CLINIC | Age: 73
End: 2023-10-30
Payer: MEDICARE

## 2023-10-30 VITALS
HEIGHT: 72 IN | SYSTOLIC BLOOD PRESSURE: 126 MMHG | HEART RATE: 58 BPM | WEIGHT: 234 LBS | BODY MASS INDEX: 31.69 KG/M2 | DIASTOLIC BLOOD PRESSURE: 78 MMHG

## 2023-10-30 DIAGNOSIS — S68.110A AMPUTATION OF RIGHT INDEX FINGER: Primary | ICD-10-CM

## 2023-10-30 PROCEDURE — 99024 POSTOP FOLLOW-UP VISIT: CPT | Mod: POP,,, | Performed by: STUDENT IN AN ORGANIZED HEALTH CARE EDUCATION/TRAINING PROGRAM

## 2023-10-30 PROCEDURE — 99024 PR POST-OP FOLLOW-UP VISIT: ICD-10-PCS | Mod: POP,,, | Performed by: STUDENT IN AN ORGANIZED HEALTH CARE EDUCATION/TRAINING PROGRAM

## 2023-11-06 NOTE — PROGRESS NOTES
Postop Clinic Note    Surgery:  Right index finger amputation 08/25/2023    History of present illness:    He is doing well regarding his right index finger amputation site however he was developing a infection around the nail of the middle finger.  He was sent to me by the therapist that he was working with.  He has no fevers or chills.  He called my office and I gave him an antibiotic and states that this has improving with the antibiotic.      Past Surgical History:   Procedure Laterality Date    BACK SURGERY      CERVICAL LAMINECTOMY      COLONOSCOPY      FINGER AMPUTATION Right 8/25/2023    Procedure: AMPUTATION, FINGER;  Surgeon: Zachery Padilla MD;  Location: [facility];  Service: Orthopedics;  Laterality: Right;    INJECTION OF NERVE      LAMINECTOMY      WISDOM TOOTH EXTRACTION             Examination:    Vital Signs:    There were no vitals filed for this visit.      There is no height or weight on file to calculate BMI.    Constitution:   Well-developed, well nourished patient in no acute distress.  Neurological:   Alert and oriented x 3 and cooperative to examination.     Psychiatric/Behavior: Normal mental status.  Respiratory:   No shortness of breath.  Eyes:    Extraoccular muscles intact  Skin:    No scars, rash or suspicious lesions.    MSK:   Right hand: Right index finger amputation stump is healed no signs of infection. He is able to flex and extend actively at the MP joint. No TTP around the incision.  He was a superficial infection over the radial aspect of the middle finger nail fold.  I am not able to express significant pus today.  He has some sensitivity in his area but it is not very tender to palpation.  Sensation light touch intact around the amputation stump              Imaging:   No new imaging     Assessment:  Status post right index finger amputation, right middle finger nail fold infection    Plan:  He is doing great regarding his right index finger.  He has developed a paronychia  around the right middle finger nail fold.  He can continue the antibiotics that I gave him and I will see him back in 1 week to make sure he is improving    Follow Up:  1 week  Xray at next visit: none

## 2023-11-20 ENCOUNTER — LAB VISIT (OUTPATIENT)
Dept: LAB | Facility: HOSPITAL | Age: 73
End: 2023-11-20
Attending: STUDENT IN AN ORGANIZED HEALTH CARE EDUCATION/TRAINING PROGRAM
Payer: MEDICARE

## 2023-11-20 ENCOUNTER — OFFICE VISIT (OUTPATIENT)
Dept: ORTHOPEDICS | Facility: CLINIC | Age: 73
End: 2023-11-20
Payer: MEDICARE

## 2023-11-20 ENCOUNTER — ANESTHESIA EVENT (OUTPATIENT)
Dept: SURGERY | Facility: HOSPITAL | Age: 73
End: 2023-11-20
Payer: MEDICARE

## 2023-11-20 VITALS
HEART RATE: 76 BPM | WEIGHT: 238 LBS | SYSTOLIC BLOOD PRESSURE: 127 MMHG | DIASTOLIC BLOOD PRESSURE: 79 MMHG | HEIGHT: 72 IN | BODY MASS INDEX: 32.23 KG/M2

## 2023-11-20 DIAGNOSIS — L03.011 PARONYCHIA OF RIGHT MIDDLE FINGER: Primary | ICD-10-CM

## 2023-11-20 DIAGNOSIS — Z01.818 PREOP EXAMINATION: ICD-10-CM

## 2023-11-20 DIAGNOSIS — R73.9 HYPERGLYCEMIA, UNSPECIFIED: ICD-10-CM

## 2023-11-20 DIAGNOSIS — M79.641 RIGHT HAND PAIN: ICD-10-CM

## 2023-11-20 DIAGNOSIS — L03.011 PARONYCHIA OF RIGHT MIDDLE FINGER: ICD-10-CM

## 2023-11-20 LAB
ALBUMIN SERPL-MCNC: 4.1 G/DL (ref 3.4–4.8)
ALBUMIN/GLOB SERPL: 1.2 RATIO (ref 1.1–2)
ALP SERPL-CCNC: 48 UNIT/L (ref 40–150)
ALT SERPL-CCNC: 43 UNIT/L (ref 0–55)
AST SERPL-CCNC: 36 UNIT/L (ref 5–34)
BASOPHILS # BLD AUTO: 0.06 X10(3)/MCL
BASOPHILS NFR BLD AUTO: 0.7 %
BILIRUB SERPL-MCNC: 0.5 MG/DL
BUN SERPL-MCNC: 12.3 MG/DL (ref 8.4–25.7)
CALCIUM SERPL-MCNC: 9.8 MG/DL (ref 8.8–10)
CHLORIDE SERPL-SCNC: 108 MMOL/L (ref 98–107)
CO2 SERPL-SCNC: 23 MMOL/L (ref 23–31)
CREAT SERPL-MCNC: 0.85 MG/DL (ref 0.73–1.18)
CRP SERPL HS-MCNC: 0.77 MG/L
EOSINOPHIL # BLD AUTO: 0.4 X10(3)/MCL (ref 0–0.9)
EOSINOPHIL NFR BLD AUTO: 4.9 %
ERYTHROCYTE [DISTWIDTH] IN BLOOD BY AUTOMATED COUNT: 13.4 % (ref 11.5–17)
ERYTHROCYTE [SEDIMENTATION RATE] IN BLOOD: 11 MM/HR (ref 0–15)
EST. AVERAGE GLUCOSE BLD GHB EST-MCNC: 134.1 MG/DL
GFR SERPLBLD CREATININE-BSD FMLA CKD-EPI: >60 MLS/MIN/1.73/M2
GLOBULIN SER-MCNC: 3.5 GM/DL (ref 2.4–3.5)
GLUCOSE SERPL-MCNC: 167 MG/DL (ref 82–115)
HBA1C MFR BLD: 6.3 %
HCT VFR BLD AUTO: 42 % (ref 42–52)
HGB BLD-MCNC: 14.2 G/DL (ref 14–18)
IMM GRANULOCYTES # BLD AUTO: 0.03 X10(3)/MCL (ref 0–0.04)
IMM GRANULOCYTES NFR BLD AUTO: 0.4 %
LYMPHOCYTES # BLD AUTO: 1.89 X10(3)/MCL (ref 0.6–4.6)
LYMPHOCYTES NFR BLD AUTO: 23.1 %
MCH RBC QN AUTO: 29 PG (ref 27–31)
MCHC RBC AUTO-ENTMCNC: 33.8 G/DL (ref 33–36)
MCV RBC AUTO: 85.9 FL (ref 80–94)
MONOCYTES # BLD AUTO: 0.72 X10(3)/MCL (ref 0.1–1.3)
MONOCYTES NFR BLD AUTO: 8.8 %
NEUTROPHILS # BLD AUTO: 5.07 X10(3)/MCL (ref 2.1–9.2)
NEUTROPHILS NFR BLD AUTO: 62.1 %
NRBC BLD AUTO-RTO: 0 %
PLATELET # BLD AUTO: 118 X10(3)/MCL (ref 130–400)
PMV BLD AUTO: 11.8 FL (ref 7.4–10.4)
POTASSIUM SERPL-SCNC: 4.6 MMOL/L (ref 3.5–5.1)
PROT SERPL-MCNC: 7.6 GM/DL (ref 5.8–7.6)
RBC # BLD AUTO: 4.89 X10(6)/MCL (ref 4.7–6.1)
SODIUM SERPL-SCNC: 139 MMOL/L (ref 136–145)
WBC # SPEC AUTO: 8.17 X10(3)/MCL (ref 4.5–11.5)

## 2023-11-20 PROCEDURE — 85025 COMPLETE CBC W/AUTO DIFF WBC: CPT

## 2023-11-20 PROCEDURE — 85652 RBC SED RATE AUTOMATED: CPT

## 2023-11-20 PROCEDURE — 99214 PR OFFICE/OUTPT VISIT, EST, LEVL IV, 30-39 MIN: ICD-10-PCS | Mod: 24,,, | Performed by: STUDENT IN AN ORGANIZED HEALTH CARE EDUCATION/TRAINING PROGRAM

## 2023-11-20 PROCEDURE — 99214 OFFICE O/P EST MOD 30 MIN: CPT | Mod: 24,,, | Performed by: STUDENT IN AN ORGANIZED HEALTH CARE EDUCATION/TRAINING PROGRAM

## 2023-11-20 PROCEDURE — 86141 C-REACTIVE PROTEIN HS: CPT

## 2023-11-20 PROCEDURE — 80053 COMPREHEN METABOLIC PANEL: CPT

## 2023-11-20 PROCEDURE — 83036 HEMOGLOBIN GLYCOSYLATED A1C: CPT

## 2023-11-20 PROCEDURE — 36415 COLL VENOUS BLD VENIPUNCTURE: CPT

## 2023-11-20 RX ORDER — SODIUM CHLORIDE 9 MG/ML
INJECTION, SOLUTION INTRAVENOUS CONTINUOUS
Status: CANCELLED | OUTPATIENT
Start: 2023-11-20

## 2023-11-20 NOTE — PROGRESS NOTES
Postop Clinic Note    Surgery:  Right index finger amputation 08/25/2023    History of present illness:    Patient complains of worsening pain, swelling, drainage from the right middle finger nail.  He is able to express pus from the middle finger.  Denies any fevers or chills    Past Surgical History:   Procedure Laterality Date    BACK SURGERY      CERVICAL LAMINECTOMY      COLONOSCOPY      FINGER AMPUTATION Right 8/25/2023    Procedure: AMPUTATION, FINGER;  Surgeon: Zachery Padilla MD;  Location: [facility];  Service: Orthopedics;  Laterality: Right;    INJECTION OF NERVE      LAMINECTOMY      WISDOM TOOTH EXTRACTION             Examination:    Vital Signs:    Vitals:    11/20/23 1013   BP: 127/79   Pulse: 76   Weight: 108 kg (238 lb)   Height: 6' (1.829 m)       Body mass index is 32.28 kg/m².    Constitution:   Well-developed, well nourished patient in no acute distress.  Neurological:   Alert and oriented x 3 and cooperative to examination.     Psychiatric/Behavior: Normal mental status.  Respiratory:   No shortness of breath.  Eyes:    Extraoccular muscles intact  Skin:    No scars, rash or suspicious lesions.    MSK:   Right hand: Right index finger amputation stump is healed no signs of infection. He is able to flex and extend actively at the MP joint. No TTP around the incision.  He is able to flex his digits to 1 cm away from the distal palmar crease.  Worsening paronychia over the middle finger.  I am able to express a small amount of pus.  Sensation light touch intact around the amputation stump              Imaging:   No new imaging     Assessment:  Status post right index finger amputation, right middle finger paronychia    Plan:  I will take him to the operating room for drainage of the paronychia.  I will then put him on antibiotics postoperatively I will see him back in 1-2 weeks for wound check    I explained that surgery and the nature of their condition are not without risks. These include, but are  not limited to, bleeding, infection, neurovascular compromise, wound complications, scarring, cosmetic defects, need for later and/or repeated surgeries, pain, loss of ROM, loss of function, deformity, functional abnormalities, stiffness, thromboembolic complications, compartment syndrome, loss of limb, loss of life, anesthetic complications, and other imponderables. I explained that these can occur despite the adequacy of treatments rendered, and that their risks are heightened given the nature of their condition. They verbalized understanding. They would like to continue with surgery at this time. If appropriate family was involved with surgical discussion.      Follow Up:  After surgery  Xray at next visit: none

## 2023-11-20 NOTE — H&P (VIEW-ONLY)
Postop Clinic Note    Surgery:  Right index finger amputation 08/25/2023    History of present illness:    Patient complains of worsening pain, swelling, drainage from the right middle finger nail.  He is able to express pus from the middle finger.  Denies any fevers or chills    Past Surgical History:   Procedure Laterality Date    BACK SURGERY      CERVICAL LAMINECTOMY      COLONOSCOPY      FINGER AMPUTATION Right 8/25/2023    Procedure: AMPUTATION, FINGER;  Surgeon: Zachery Padilla MD;  Location: [facility];  Service: Orthopedics;  Laterality: Right;    INJECTION OF NERVE      LAMINECTOMY      WISDOM TOOTH EXTRACTION             Examination:    Vital Signs:    Vitals:    11/20/23 1013   BP: 127/79   Pulse: 76   Weight: 108 kg (238 lb)   Height: 6' (1.829 m)       Body mass index is 32.28 kg/m².    Constitution:   Well-developed, well nourished patient in no acute distress.  Neurological:   Alert and oriented x 3 and cooperative to examination.     Psychiatric/Behavior: Normal mental status.  Respiratory:   No shortness of breath.  Eyes:    Extraoccular muscles intact  Skin:    No scars, rash or suspicious lesions.    MSK:   Right hand: Right index finger amputation stump is healed no signs of infection. He is able to flex and extend actively at the MP joint. No TTP around the incision.  He is able to flex his digits to 1 cm away from the distal palmar crease.  Worsening paronychia over the middle finger.  I am able to express a small amount of pus.  Sensation light touch intact around the amputation stump              Imaging:   No new imaging     Assessment:  Status post right index finger amputation, right middle finger paronychia    Plan:  I will take him to the operating room for drainage of the paronychia.  I will then put him on antibiotics postoperatively I will see him back in 1-2 weeks for wound check    I explained that surgery and the nature of their condition are not without risks. These include, but are  not limited to, bleeding, infection, neurovascular compromise, wound complications, scarring, cosmetic defects, need for later and/or repeated surgeries, pain, loss of ROM, loss of function, deformity, functional abnormalities, stiffness, thromboembolic complications, compartment syndrome, loss of limb, loss of life, anesthetic complications, and other imponderables. I explained that these can occur despite the adequacy of treatments rendered, and that their risks are heightened given the nature of their condition. They verbalized understanding. They would like to continue with surgery at this time. If appropriate family was involved with surgical discussion.      Follow Up:  After surgery  Xray at next visit: none

## 2023-11-21 ENCOUNTER — HOSPITAL ENCOUNTER (OUTPATIENT)
Facility: HOSPITAL | Age: 73
Discharge: HOME | End: 2023-11-21
Attending: STUDENT IN AN ORGANIZED HEALTH CARE EDUCATION/TRAINING PROGRAM | Admitting: STUDENT IN AN ORGANIZED HEALTH CARE EDUCATION/TRAINING PROGRAM
Payer: MEDICARE

## 2023-11-21 ENCOUNTER — ANESTHESIA (OUTPATIENT)
Dept: SURGERY | Facility: HOSPITAL | Age: 73
End: 2023-11-21
Payer: MEDICARE

## 2023-11-21 DIAGNOSIS — L03.011 PARONYCHIA OF RIGHT MIDDLE FINGER: ICD-10-CM

## 2023-11-21 PROCEDURE — D9220A PRA ANESTHESIA: Mod: CRNA,,,

## 2023-11-21 PROCEDURE — 82962 GLUCOSE BLOOD TEST: CPT | Performed by: STUDENT IN AN ORGANIZED HEALTH CARE EDUCATION/TRAINING PROGRAM

## 2023-11-21 PROCEDURE — 36000704 HC OR TIME LEV I 1ST 15 MIN: Performed by: STUDENT IN AN ORGANIZED HEALTH CARE EDUCATION/TRAINING PROGRAM

## 2023-11-21 PROCEDURE — 63600175 PHARM REV CODE 636 W HCPCS

## 2023-11-21 PROCEDURE — 37000009 HC ANESTHESIA EA ADD 15 MINS: Performed by: STUDENT IN AN ORGANIZED HEALTH CARE EDUCATION/TRAINING PROGRAM

## 2023-11-21 PROCEDURE — 71000015 HC POSTOP RECOV 1ST HR: Performed by: STUDENT IN AN ORGANIZED HEALTH CARE EDUCATION/TRAINING PROGRAM

## 2023-11-21 PROCEDURE — 36000705 HC OR TIME LEV I EA ADD 15 MIN: Performed by: STUDENT IN AN ORGANIZED HEALTH CARE EDUCATION/TRAINING PROGRAM

## 2023-11-21 PROCEDURE — 10060 I&D ABSCESS SIMPLE/SINGLE: CPT | Mod: 78,F7,, | Performed by: STUDENT IN AN ORGANIZED HEALTH CARE EDUCATION/TRAINING PROGRAM

## 2023-11-21 PROCEDURE — 37000008 HC ANESTHESIA 1ST 15 MINUTES: Performed by: STUDENT IN AN ORGANIZED HEALTH CARE EDUCATION/TRAINING PROGRAM

## 2023-11-21 PROCEDURE — D9220A PRA ANESTHESIA: ICD-10-PCS | Mod: ANES,,, | Performed by: ANESTHESIOLOGY

## 2023-11-21 PROCEDURE — 25000003 PHARM REV CODE 250

## 2023-11-21 PROCEDURE — D9220A PRA ANESTHESIA: Mod: ANES,,, | Performed by: ANESTHESIOLOGY

## 2023-11-21 PROCEDURE — 10060 PR DRAIN SKIN ABSCESS SIMPLE: ICD-10-PCS | Mod: 78,F7,, | Performed by: STUDENT IN AN ORGANIZED HEALTH CARE EDUCATION/TRAINING PROGRAM

## 2023-11-21 PROCEDURE — D9220A PRA ANESTHESIA: ICD-10-PCS | Mod: CRNA,,,

## 2023-11-21 RX ORDER — SODIUM CHLORIDE 9 MG/ML
INJECTION, SOLUTION INTRAVENOUS CONTINUOUS
Status: DISCONTINUED | OUTPATIENT
Start: 2023-11-21 | End: 2023-11-21 | Stop reason: HOSPADM

## 2023-11-21 RX ORDER — MORPHINE SULFATE 4 MG/ML
4 INJECTION, SOLUTION INTRAMUSCULAR; INTRAVENOUS
Status: CANCELLED | OUTPATIENT
Start: 2023-11-21

## 2023-11-21 RX ORDER — HYDROCODONE BITARTRATE AND ACETAMINOPHEN 5; 325 MG/1; MG/1
1 TABLET ORAL EVERY 4 HOURS PRN
Status: CANCELLED | OUTPATIENT
Start: 2023-11-21

## 2023-11-21 RX ORDER — BUPIVACAINE HYDROCHLORIDE 2.5 MG/ML
INJECTION, SOLUTION EPIDURAL; INFILTRATION; INTRACAUDAL
Status: DISCONTINUED
Start: 2023-11-21 | End: 2023-11-21 | Stop reason: HOSPADM

## 2023-11-21 RX ORDER — HYDROMORPHONE HYDROCHLORIDE 2 MG/ML
0.2 INJECTION, SOLUTION INTRAMUSCULAR; INTRAVENOUS; SUBCUTANEOUS EVERY 5 MIN PRN
Status: CANCELLED | OUTPATIENT
Start: 2023-11-21

## 2023-11-21 RX ORDER — HYDROCODONE BITARTRATE AND ACETAMINOPHEN 5; 325 MG/1; MG/1
1 TABLET ORAL EVERY 6 HOURS PRN
Qty: 28 TABLET | Refills: 0 | Status: SHIPPED | OUTPATIENT
Start: 2023-11-21

## 2023-11-21 RX ORDER — LIDOCAINE HYDROCHLORIDE 10 MG/ML
1 INJECTION, SOLUTION EPIDURAL; INFILTRATION; INTRACAUDAL; PERINEURAL ONCE
Status: CANCELLED | OUTPATIENT
Start: 2023-11-21 | End: 2023-11-21

## 2023-11-21 RX ORDER — INDOMETHACIN 25 MG/1
CAPSULE ORAL
Status: DISCONTINUED
Start: 2023-11-21 | End: 2023-11-21 | Stop reason: HOSPADM

## 2023-11-21 RX ORDER — SODIUM CHLORIDE, SODIUM GLUCONATE, SODIUM ACETATE, POTASSIUM CHLORIDE AND MAGNESIUM CHLORIDE 30; 37; 368; 526; 502 MG/100ML; MG/100ML; MG/100ML; MG/100ML; MG/100ML
INJECTION, SOLUTION INTRAVENOUS CONTINUOUS
Status: CANCELLED | OUTPATIENT
Start: 2023-11-21 | End: 2023-12-21

## 2023-11-21 RX ORDER — SODIUM CHLORIDE 9 MG/ML
INJECTION, SOLUTION INTRAVENOUS CONTINUOUS
Status: CANCELLED | OUTPATIENT
Start: 2023-11-21

## 2023-11-21 RX ORDER — SODIUM CITRATE AND CITRIC ACID MONOHYDRATE 334; 500 MG/5ML; MG/5ML
30 SOLUTION ORAL
Status: CANCELLED | OUTPATIENT
Start: 2023-11-21

## 2023-11-21 RX ORDER — MEPERIDINE HYDROCHLORIDE 25 MG/ML
12.5 INJECTION INTRAMUSCULAR; INTRAVENOUS; SUBCUTANEOUS ONCE AS NEEDED
Status: CANCELLED | OUTPATIENT
Start: 2023-11-21 | End: 2023-11-22

## 2023-11-21 RX ORDER — METHOCARBAMOL 500 MG/1
500 TABLET, FILM COATED ORAL EVERY 6 HOURS PRN
Status: CANCELLED | OUTPATIENT
Start: 2023-11-21

## 2023-11-21 RX ORDER — ONDANSETRON 2 MG/ML
INJECTION INTRAMUSCULAR; INTRAVENOUS
Status: DISCONTINUED | OUTPATIENT
Start: 2023-11-21 | End: 2023-11-21

## 2023-11-21 RX ORDER — CEFAZOLIN SODIUM IN 0.9 % NACL 2 G/100 ML
PLASTIC BAG, INJECTION (ML) INTRAVENOUS
Status: DISCONTINUED | OUTPATIENT
Start: 2023-11-21 | End: 2023-11-21

## 2023-11-21 RX ORDER — PROPOFOL 10 MG/ML
VIAL (ML) INTRAVENOUS CONTINUOUS PRN
Status: DISCONTINUED | OUTPATIENT
Start: 2023-11-21 | End: 2023-11-21

## 2023-11-21 RX ORDER — LIDOCAINE HYDROCHLORIDE 10 MG/ML
INJECTION INFILTRATION; PERINEURAL
Status: DISCONTINUED
Start: 2023-11-21 | End: 2023-11-21 | Stop reason: HOSPADM

## 2023-11-21 RX ORDER — SULFAMETHOXAZOLE AND TRIMETHOPRIM 800; 160 MG/1; MG/1
1 TABLET ORAL 2 TIMES DAILY
Qty: 28 TABLET | Refills: 0 | Status: SHIPPED | OUTPATIENT
Start: 2023-11-21 | End: 2023-12-05

## 2023-11-21 RX ORDER — LIDOCAINE HYDROCHLORIDE 10 MG/ML
INJECTION, SOLUTION EPIDURAL; INFILTRATION; INTRACAUDAL; PERINEURAL
Status: DISCONTINUED | OUTPATIENT
Start: 2023-11-21 | End: 2023-11-21

## 2023-11-21 RX ORDER — ACETAMINOPHEN 10 MG/ML
1000 INJECTION, SOLUTION INTRAVENOUS ONCE
Status: CANCELLED | OUTPATIENT
Start: 2023-11-21 | End: 2023-11-21

## 2023-11-21 RX ORDER — METOCLOPRAMIDE HYDROCHLORIDE 5 MG/ML
10 INJECTION INTRAMUSCULAR; INTRAVENOUS EVERY 6 HOURS PRN
Status: CANCELLED | OUTPATIENT
Start: 2023-11-21

## 2023-11-21 RX ORDER — ONDANSETRON 2 MG/ML
4 INJECTION INTRAMUSCULAR; INTRAVENOUS EVERY 6 HOURS PRN
Status: CANCELLED | OUTPATIENT
Start: 2023-11-21

## 2023-11-21 RX ADMIN — SODIUM CHLORIDE, SODIUM GLUCONATE, SODIUM ACETATE, POTASSIUM CHLORIDE AND MAGNESIUM CHLORIDE: 526; 502; 368; 37; 30 INJECTION, SOLUTION INTRAVENOUS at 03:11

## 2023-11-21 RX ADMIN — PROPOFOL 200 MCG/KG/MIN: 10 INJECTION, EMULSION INTRAVENOUS at 03:11

## 2023-11-21 RX ADMIN — LIDOCAINE HYDROCHLORIDE 50 MG: 10 INJECTION, SOLUTION EPIDURAL; INFILTRATION; INTRACAUDAL; PERINEURAL at 03:11

## 2023-11-21 RX ADMIN — ONDANSETRON HYDROCHLORIDE 4 MG: 2 SOLUTION INTRAMUSCULAR; INTRAVENOUS at 03:11

## 2023-11-21 RX ADMIN — Medication 2 G: at 03:11

## 2023-11-21 NOTE — OP NOTE
Operative Note    Patient Information:  Sid Richards    Date of Surgery:  11/21/2023    Surgeon:  Zachery Padilla MD    Assistant:  None    Pre-operative Diagnosis:  Right middle finger paronychia    Post-operative Diagnosis:  Same    Procedure Performed:  Incision and drainage right middle finger paronychia CPT 16943    Anesthesia:  Mac and local    Complications:  None    Blood Loss:  See anesthesia record    Specimens:  None    Implants:  * No implants in log *     Indications for Procedure:  Sid Richards is a 73 y.o. male that was involved in a traumatic accident with a table saw in August of 2023.  He had a traumatic laceration of the middle finger.  His wounds healed but then he subsequently developed a paronychia of the right middle finger.  I tried to treat this with antibiotics alone initially but his infection returned.  I booked and consented him for drainage of the paronychia    Risks and benefits of the procedure were discussed with the patient. I explained that surgery and the nature of their condition are not without risks. These include, but are not limited to, bleeding, infection, neurovascular compromise, wound complications, scarring, cosmetic defects, need for later and/or repeated surgeries, pain, loss of ROM, loss of function, deformity, functional abnormalities, stiffness, thromboembolic complications, compartment syndrome, loss of limb, loss of life, anesthetic complications, and other imponderables. I explained that these can occur despite the adequacy of treatments rendered, and that their risks are heightened given the nature of their condition. They verbalized understanding. They would like to continue with surgery at this time. If appropriate family was involved with surgical discussion. The patient expressed understanding of and agreement with the plan. Informed consent was obtained and signed prior going to the operative room.    Procedure in Detail:  Patient was brought to the  operating room by the anesthesia team. Anesthesia was administered per the anesthesia record. The patient was left on the stretcher and a hand table was placed on the side of the bed.     Time out was performed and all parties present agreed with correct patient, correct procedure, correct side, correct site. The operative extremity was prepped and draped in standard normal fashion.  A digital block was performed of the right middle finger    Pre-incision antibiotics were administered prior to skin incision. The tourniquet was inflated to 250mm HG.     A oblique incision was made over the radial aspect of the nail fold.  The infected area of the nail fold was elevated.  A 2 mm strip of the nail was excised and a curette was used to debride any purulent fibrinous material underneath this nail fold.  The wound was copiously irrigated.  Packing gauze was placed in his nail fold.  The wound was dressed with a sterile dressing      Tourniquet was deflated. Patient was awoken from anesthesia without complications and transferred to the recovery room in stable condition.       Post-operative Plan:  The patient will remove the packing gauze on postoperative day 2.  He will be discharged on oral Bactrim.  I will see him in clinic in 2 weeks.  He can perform daily dressing changes to monitor his wound.  If his wound is worsening he should return and see me sooner.

## 2023-11-21 NOTE — ANESTHESIA PREPROCEDURE EVALUATION
11/21/2023  Sid Richards is a 73 y.o., male with dm, htn, and other medical problems listed in the EMR      Pre-op Assessment    I have reviewed the Patient Summary Reports.     I have reviewed the Nursing Notes. I have reviewed the NPO Status.   I have reviewed the Medications.     Review of Systems  Cardiovascular:  Exercise tolerance: good                                               Physical Exam  General: Well nourished and Cooperative    Airway:  Mallampati: II   Mouth Opening: Normal  TM Distance: Normal  Tongue: Normal  Neck ROM: Normal ROM    Chest/Lungs:  Clear to auscultation        Anesthesia Plan  Type of Anesthesia, risks & benefits discussed:    Anesthesia Type: Gen Natural Airway  Intra-op Monitoring Plan: Standard ASA Monitors  Induction:  IV  Informed Consent: Informed consent signed with the Patient and all parties understand the risks and agree with anesthesia plan.  All questions answered.   ASA Score: 3  Day of Surgery Review of History & Physical: H&P Update referred to the surgeon/provider.    Ready For Surgery From Anesthesia Perspective.     .  I explained anesthesia plan to patient/responsbile party if available.  Anesthesia consent done going over the material facts, risks, complications & alternatives, obtained which includes the possibility of altering the anesthesia plan.  I reviewed problem list, prior to admission medication list, appropriate labs, any workup, Xray, EKG etc noted below.  Patients condition is satisfactory to proceed with anesthesia plan unless otherwise noted (see anesthesia chart for details of the anesthesia plan carried out).      Pre-operative evaluation for Procedure(s) (LRB):  INCISION AND DRAINAGE, HAND cysto tubing (Right)    /65 (BP Location: Left arm, Patient Position: Lying)   Pulse 68   Temp 36.3 °C (97.4 °F) (Tympanic)   Resp 20   Ht  6' (1.829 m)   Wt 105.4 kg (232 lb 5.8 oz)   SpO2 96%   BMI 31.51 kg/m²     There is no problem list on file for this patient.      Review of patient's allergies indicates:  No Known Allergies    Current Outpatient Medications   Medication Instructions    ALPRAZolam (XANAX) 0.25 mg, Oral, 2 times daily PRN    amLODIPine (NORVASC) 2.5 mg, Oral, Daily    aspirin 81 mg, Oral, Daily    atorvastatin (LIPITOR) 80 mg, Oral, Daily    carvediloL (COREG) 25 mg, Oral, 2 times daily    cephALEXin (KEFLEX) 500 mg, Oral, Every 12 hours    famotidine (PEPCID) 40 mg, Oral, Nightly    gemfibroziL (LOPID) 600 mg, Oral, 2 times daily before meals    HYDROcodone-acetaminophen (NORCO) 5-325 mg per tablet 1 tablet, Oral, Every 6 hours PRN    melatonin 10 mg Tab 1 tablet, Oral, Nightly    metFORMIN (GLUCOPHAGE) 500 mg, Oral, 2 times daily    omega-3 fatty acids/fish oil (FISH OIL-OMEGA-3 FATTY ACIDS) 300-1,000 mg capsule Oral, Daily    pramipexole (MIRAPEX) 0.5 mg, Oral, Nightly    sertraline (ZOLOFT) 100 mg, Oral, Nightly       Past Surgical History:   Procedure Laterality Date    BACK SURGERY      disectomy    CERVICAL LAMINECTOMY  1998    COLONOSCOPY      FINGER AMPUTATION Right 08/25/2023    Procedure: AMPUTATION, FINGER;  Surgeon: Zachery Padilla MD;  Location: [facility];  Service: Orthopedics;  Laterality: Right;    INJECTION OF NERVE      LAMINECTOMY  2012    WISDOM TOOTH EXTRACTION         Social History     Socioeconomic History    Marital status:    Tobacco Use    Smoking status: Former     Current packs/day: 0.25     Average packs/day: 0.3 packs/day for 17.9 years (4.5 ttl pk-yrs)     Types: Cigarettes     Start date: 2006     Quit date: 2000    Smokeless tobacco: Never   Substance and Sexual Activity    Alcohol use: Yes     Alcohol/week: 1.0 standard drink of alcohol     Types: 1 Cans of beer per week     Comment: social    Drug use: Never    Sexual activity: Not Currently       Lab Results   Component Value Date     "WBC 8.17 11/20/2023    HGB 14.2 11/20/2023    HCT 42.0 11/20/2023    MCV 85.9 11/20/2023     (L) 11/20/2023          BMP  Lab Results   Component Value Date    HCT 42.0 11/20/2023     11/20/2023    K 4.6 11/20/2023    BUN 12.3 11/20/2023    CREATININE 0.85 11/20/2023    CALCIUM 9.8 11/20/2023        INR  No results for input(s): "PT", "INR", "PROTIME", "APTT" in the last 72 hours.        Diagnostic Studies:      EKG:  Results for orders placed or performed in visit on 08/24/23   EKG 12-lead    Collection Time: 08/24/23  9:29 AM    Narrative    Test Reason : S68.110A,    Vent. Rate : 065 BPM     Atrial Rate : 065 BPM     P-R Int : 168 ms          QRS Dur : 082 ms      QT Int : 408 ms       P-R-T Axes : 059 016 016 degrees     QTc Int : 424 ms    Normal sinus rhythm  Normal ECG  No previous ECGs available  Confirmed by Vicente Fam MD ([id number]) on 8/25/2023 6:37:40 PM    Referred By: BRITT KRAUSE           Confirmed By:Vicente Fam MD            "

## 2023-11-21 NOTE — DISCHARGE SUMMARY
[facility] - Periop Services  Discharge Note  Short Stay    Procedure(s) (LRB):  INCISION AND DRAINAGE, HAND cysto tubing (Right)      OUTCOME: Patient tolerated treatment/procedure well without complication and is now ready for discharge.    DISPOSITION: Home or Self Care    FINAL DIAGNOSIS:  <principal problem not specified>    FOLLOWUP: In clinic    DISCHARGE INSTRUCTIONS:    Discharge Procedure Orders   Diet general     Activity as tolerated     Keep surgical extremity elevated     Ice to affected area     Lifting restrictions   Order Comments: No lifting, pushing, pulling with operative extremity     No driving, operating heavy equipment or signing legal documents while taking pain medication.     Other restrictions (specify):   Order Comments: Okay to wean sling as tolerated.     Leave dressing on - Keep it clean, dry, and intact until clinic visit     Call MD for:  temperature >100.4     Call MD for:  persistent nausea and vomiting     Call MD for:  severe uncontrolled pain     Call MD for:  difficulty breathing, headache or visual disturbances     Call MD for:  redness, tenderness, or signs of infection (pain, swelling, redness, odor or green/yellow discharge around incision site)     Call MD for:  hives     Call MD for:  persistent dizziness or light-headedness     Call MD for:  extreme fatigue     Shower on day dressing removed (No bath)        TIME SPENT ON DISCHARGE: 15 minutes

## 2023-11-22 VITALS
TEMPERATURE: 97 F | HEIGHT: 72 IN | BODY MASS INDEX: 31.48 KG/M2 | WEIGHT: 232.38 LBS | SYSTOLIC BLOOD PRESSURE: 123 MMHG | RESPIRATION RATE: 18 BRPM | DIASTOLIC BLOOD PRESSURE: 78 MMHG | HEART RATE: 77 BPM | OXYGEN SATURATION: 97 %

## 2023-11-22 LAB — POCT GLUCOSE: 103 MG/DL (ref 70–110)

## 2023-11-22 NOTE — ANESTHESIA POSTPROCEDURE EVALUATION
Anesthesia Post Evaluation    Patient: Sid Richards    Procedure(s) Performed: Procedure(s) (LRB):  INCISION AND DRAINAGE, HAND cysto tubing (Right)    Final Anesthesia Type: general (/Regional//MAC)      Patient location during evaluation: PACU  Post-procedure mental status: @ basline.  Pain management: adequate    PONV status: See postop meds for drugs used to control n/v if any.  Anesthetic complications: no      Cardiovascular status: blood pressure returned to baseline  Respiratory status: @ baseline.  Hydration status: euvolemic            Vitals Value Taken Time   /78 11/21/23 1630   Temp 98 11/22/23 0737   Pulse 77 11/21/23 1630   Resp 18 11/21/23 1630   SpO2 97 % 11/21/23 1630         No case tracking events are documented in the log.      Pain/Marcie Score: No data recorded

## 2023-12-06 ENCOUNTER — OFFICE VISIT (OUTPATIENT)
Dept: ORTHOPEDICS | Facility: CLINIC | Age: 73
End: 2023-12-06
Payer: MEDICARE

## 2023-12-06 VITALS
BODY MASS INDEX: 31.42 KG/M2 | HEIGHT: 72 IN | DIASTOLIC BLOOD PRESSURE: 74 MMHG | WEIGHT: 232 LBS | SYSTOLIC BLOOD PRESSURE: 118 MMHG | HEART RATE: 73 BPM

## 2023-12-06 DIAGNOSIS — L03.011 PARONYCHIA OF RIGHT MIDDLE FINGER: Primary | ICD-10-CM

## 2023-12-06 PROCEDURE — 99024 PR POST-OP FOLLOW-UP VISIT: ICD-10-PCS | Mod: POP,,, | Performed by: STUDENT IN AN ORGANIZED HEALTH CARE EDUCATION/TRAINING PROGRAM

## 2023-12-06 PROCEDURE — 99024 POSTOP FOLLOW-UP VISIT: CPT | Mod: POP,,, | Performed by: STUDENT IN AN ORGANIZED HEALTH CARE EDUCATION/TRAINING PROGRAM

## 2023-12-06 NOTE — PROGRESS NOTES
Postop Clinic Note    Surgery:  Right index finger amputation 08/25/2023  Right middle finger paronychia drainage 11/21/2023    History of present illness:    He has improved significantly in his middle finger.  He has no pain today.  He was able to pull the packing gauze easily.  He denies any drainage.  Today is the last day of his antibiotics.    Past Surgical History:   Procedure Laterality Date    BACK SURGERY      disectomy    CERVICAL LAMINECTOMY  1998    COLONOSCOPY      FINGER AMPUTATION Right 08/25/2023    Procedure: AMPUTATION, FINGER;  Surgeon: Zachery Padilla MD;  Location: [facility];  Service: Orthopedics;  Laterality: Right;    INCISION AND DRAINAGE OF HAND Right 11/21/2023    Procedure: INCISION AND DRAINAGE, HAND cysto tubing;  Surgeon: Zachery Padilla MD;  Location: [facility];  Service: Orthopedics;  Laterality: Right;  cysto tubing    INJECTION OF NERVE      LAMINECTOMY  2012    SPINE SURGERY  ?    ?    TONSILLECTOMY  ?    ?    VASECTOMY  ?    ?    WISDOM TOOTH EXTRACTION             Examination:    Vital Signs:    Vitals:    12/06/23 1000   BP: 118/74   Pulse: 73   Weight: 105.2 kg (232 lb)   Height: 6' (1.829 m)       Body mass index is 31.46 kg/m².    Constitution:   Well-developed, well nourished patient in no acute distress.  Neurological:   Alert and oriented x 3 and cooperative to examination.     Psychiatric/Behavior: Normal mental status.  Respiratory:   No shortness of breath.  Eyes:    Extraoccular muscles intact  Skin:    No scars, rash or suspicious lesions.    MSK:   Right hand: Right index finger amputation stump is healed no signs of infection. He is able to flex and extend actively at the MP joint. No TTP around the incision.  He is able to flex his digits to 1 cm away from the distal palmar crease.  Right middle finger is healing nicely.  There is no tenderness to palpation.  There is no drainage.  Sensation light touch intact around the amputation stump              Imaging:   No  new imaging     Assessment:  Status post right index finger amputation, right middle finger paronychia    Plan:  His infection is resolving.  He was completed his antibiotics today.  He no longer needs any further antibiotics.  Can continue monitoring this wound.  I will see him back in a month to make sure that his infection is completely resolved      Follow Up:  1 month  Xray at next visit: none

## 2024-01-10 ENCOUNTER — OFFICE VISIT (OUTPATIENT)
Dept: ORTHOPEDICS | Facility: CLINIC | Age: 74
End: 2024-01-10
Payer: MEDICARE

## 2024-01-10 VITALS
HEART RATE: 68 BPM | DIASTOLIC BLOOD PRESSURE: 59 MMHG | SYSTOLIC BLOOD PRESSURE: 111 MMHG | BODY MASS INDEX: 34 KG/M2 | WEIGHT: 251 LBS | HEIGHT: 72 IN

## 2024-01-10 DIAGNOSIS — L03.011 PARONYCHIA OF RIGHT MIDDLE FINGER: Primary | ICD-10-CM

## 2024-01-10 PROCEDURE — 99213 OFFICE O/P EST LOW 20 MIN: CPT | Mod: ,,, | Performed by: STUDENT IN AN ORGANIZED HEALTH CARE EDUCATION/TRAINING PROGRAM

## 2024-01-10 NOTE — PROGRESS NOTES
Postop Clinic Note    Surgery:  Right index finger amputation 08/25/2023  Right middle finger paronychia drainage 11/21/2023    History of present illness:    Patient is doing great.  He has no pain today.  He is able to make a fist and has been using his hand for all activities.  He has no longer in therapy.  No fevers or chills.  No drainage from the middle finger nail.    Past Surgical History:   Procedure Laterality Date    BACK SURGERY      disectomy    CERVICAL LAMINECTOMY  1998    COLONOSCOPY      FINGER AMPUTATION Right 08/25/2023    Procedure: AMPUTATION, FINGER;  Surgeon: Zachery Padilla MD;  Location: [facility];  Service: Orthopedics;  Laterality: Right;    INCISION AND DRAINAGE OF HAND Right 11/21/2023    Procedure: INCISION AND DRAINAGE, HAND cysto tubing;  Surgeon: Zachery Padilla MD;  Location: [facility] OR;  Service: Orthopedics;  Laterality: Right;  cysto tubing    INJECTION OF NERVE      LAMINECTOMY  2012    SPINE SURGERY  ?    ?    TONSILLECTOMY  ?    ?    VASECTOMY  ?    ?    WISDOM TOOTH EXTRACTION             Examination:    Vital Signs:    There were no vitals filed for this visit.      There is no height or weight on file to calculate BMI.    Constitution:   Well-developed, well nourished patient in no acute distress.  Neurological:   Alert and oriented x 3 and cooperative to examination.     Psychiatric/Behavior: Normal mental status.  Respiratory:   No shortness of breath.  Eyes:    Extraoccular muscles intact  Skin:    No scars, rash or suspicious lesions.    MSK:   Right hand: Right index finger amputation stump is healed no signs of infection. He is able to flex and extend actively at the MP joint. No TTP around the incision.  He is able to flex his digits to the distal palmar crease.  Right middle finger is healing nicely.  There is no tenderness to palpation.  There is no drainage.  Sensation light touch intact around the amputation stump              Imaging:   No new imaging     Assessment:   Status post right index finger amputation, right middle finger paronychia    Plan:  His infection has resolved.  At this point he can continue full activities as tolerated and see me back as needed      Follow Up:  As needed  Xray at next visit: none

## (undated) DEVICE — COVER FULLGUARD SHOE HIGH-TOP

## (undated) DEVICE — BANDAGE KERLIX AMD

## (undated) DEVICE — SPONGE DERMACEA GAUZE 4X4

## (undated) DEVICE — IRRIGATION SET Y-TYPE TUR/BLAD

## (undated) DEVICE — GOWN SMARTGOWN 3XL XLONG

## (undated) DEVICE — SPONGE KERLIX SUPER 6X6.75IN

## (undated) DEVICE — GOWN NONREINF SET-IN SLV XL

## (undated) DEVICE — DRESSING XEROFORM 5X9IN

## (undated) DEVICE — Device

## (undated) DEVICE — BLADE SURG STAINLESS STEEL #15

## (undated) DEVICE — GAUZE PACKING STRIP PLAIN 1X5

## (undated) DEVICE — GLOVE SENSICARE PI GRN 7.5

## (undated) DEVICE — CORD BIPOLAR 12 FOOT

## (undated) DEVICE — BANDAGE GAUZE 6PLY FLUFF 2X3

## (undated) DEVICE — BANDAGE ESMARK LATEX FREE 4INX

## (undated) DEVICE — PADDING 4X4YD SPECIALIST100

## (undated) DEVICE — ELECTRODE REM POLYHESIVE II

## (undated) DEVICE — TOURNI-COT LARGE

## (undated) DEVICE — DRAPE HAND STERILE

## (undated) DEVICE — BANDAGE ACE DOUBLE STER 6IN

## (undated) DEVICE — GLOVE PROTEXIS PI CRM 7

## (undated) DEVICE — PADDING CAST SOFT-ROLL 4 X 4

## (undated) DEVICE — TAPE SILK 3IN

## (undated) DEVICE — BANDAGE ACE NON LATEX 3IN

## (undated) DEVICE — PAD CAST 6X4YD SPECIALISTIC

## (undated) DEVICE — BANDAGE ACE NON LATEX 2IN

## (undated) DEVICE — DRAPE SURG W/TWL 17 5/8X23

## (undated) DEVICE — BANDAGE MATRIX HK LOOP 4IN 5YD

## (undated) DEVICE — DRESSING XEROFORM 1X8IN

## (undated) DEVICE — SUT ETHILON 3-0 PS2 18 BLK